# Patient Record
Sex: FEMALE | Race: WHITE | NOT HISPANIC OR LATINO | Employment: FULL TIME | URBAN - METROPOLITAN AREA
[De-identification: names, ages, dates, MRNs, and addresses within clinical notes are randomized per-mention and may not be internally consistent; named-entity substitution may affect disease eponyms.]

---

## 2017-01-05 ENCOUNTER — ANTICOAGULATION MONITORING (OUTPATIENT)
Dept: VASCULAR LAB | Facility: MEDICAL CENTER | Age: 57
End: 2017-01-05

## 2017-01-05 DIAGNOSIS — Z95.2 HX OF MECHANICAL AORTIC VALVE REPLACEMENT: ICD-10-CM

## 2017-01-05 LAB — INR PPP: 5.7 (ref 2–3.5)

## 2017-01-05 NOTE — PROGRESS NOTES
Anticoagulation Summary as of 1/5/2017     INR goal 2.5-3.5   Selected INR 5.7! (1/5/2017)   Maintenance plan 10 mg (2 mg x 5) on Mon, Wed, Fri; 8 mg (2 mg x 4) all other days   Weekly total 62 mg   Plan last modified Pedro Palma, PHARMD (12/5/2016)   Next INR check 1/12/2017   Target end date Indefinite    Indications   Hx of mechanical aortic valve replacement [V43.3] [Z95.2]         Anticoagulation Episode Summary     INR check location Home Draw    Preferred lab     Send INR reminders to     Comments       Anticoagulation Care Providers     Provider Role Specialty Phone number    Jerry Latham M.D. Referring Cardiology [phone]    Audie Isabel, PHARMD Responsible          Anticoagulation Patient Findings    Spoke to patient's  on phone. Lorraine has been drinking more wine than usual lately which may help explain supratherapeutic INR. HOLD warfarin tonight, 6 mg tomorrow, then resume previously prescribed regimen. Follow up INR in one week.    Audie Isabel, PHARMD

## 2017-01-12 LAB — INR PPP: 3.9 (ref 2–3.5)

## 2017-01-13 ENCOUNTER — ANTICOAGULATION MONITORING (OUTPATIENT)
Dept: VASCULAR LAB | Facility: MEDICAL CENTER | Age: 57
End: 2017-01-13

## 2017-01-13 DIAGNOSIS — Z95.2 HX OF MECHANICAL AORTIC VALVE REPLACEMENT: ICD-10-CM

## 2017-01-13 NOTE — PROGRESS NOTES
Anticoagulation Summary as of 1/13/2017     INR goal 2.5-3.5   Selected INR 3.9! (1/12/2017)   Maintenance plan 10 mg (2 mg x 5) on Mon, Wed, Fri; 8 mg (2 mg x 4) all other days   Weekly total 62 mg   Plan last modified Pedro Palma, PHARMD (12/5/2016)   Next INR check 1/19/2017   Target end date Indefinite    Indications   Hx of mechanical aortic valve replacement [V43.3] [Z95.2]         Anticoagulation Episode Summary     INR check location Home Draw    Preferred lab     Send INR reminders to     Comments       Anticoagulation Care Providers     Provider Role Specialty Phone number    Jerry Latham M.D. Referring Cardiology [phone]    Audie Isabel, PHARMD Responsible          Anticoagulation Patient Findings    Spoke to patient's  on phone. Decrease warfarin dose to 8 mg tonight then resume previously prescribed regimen. Follow up INR in one week.    Audie Isabel, PHARMD

## 2017-01-23 ENCOUNTER — ANTICOAGULATION MONITORING (OUTPATIENT)
Dept: VASCULAR LAB | Facility: MEDICAL CENTER | Age: 57
End: 2017-01-23

## 2017-01-23 DIAGNOSIS — Z95.2 HX OF MECHANICAL AORTIC VALVE REPLACEMENT: ICD-10-CM

## 2017-01-23 LAB — INR PPP: 3.6 (ref 2–3.5)

## 2017-01-23 NOTE — PROGRESS NOTES
Anticoagulation Summary as of 1/23/2017     INR goal 2.5-3.5   Selected INR 3.6! (1/23/2017)   Maintenance plan 10 mg (2 mg x 5) on Mon, Wed, Fri; 8 mg (2 mg x 4) all other days   Weekly total 62 mg   No change documented Audie Isabel, PHARMD   Plan last modified MITCHELL LuuD (12/5/2016)   Next INR check 1/30/2017   Target end date Indefinite    Indications   Hx of mechanical aortic valve replacement [V43.3] [Z95.2]         Anticoagulation Episode Summary     INR check location Home Draw    Preferred lab     Send INR reminders to     Comments       Anticoagulation Care Providers     Provider Role Specialty Phone number    Jerry Latham M.D. Referring Cardiology [phone]    Audie Isabel, PHARMD Responsible          Spoke to patient's  on phone. They just arrived back into [place] after extended travel across the . As Lorraine will be back on a much more regular diet we will continue her current dose of warfarin with close follow up. INR in one week.    Audie Isabel, PHARMD

## 2017-01-31 ENCOUNTER — ANTICOAGULATION MONITORING (OUTPATIENT)
Dept: VASCULAR LAB | Facility: MEDICAL CENTER | Age: 57
End: 2017-01-31

## 2017-01-31 DIAGNOSIS — Z95.2 HX OF MECHANICAL AORTIC VALVE REPLACEMENT: ICD-10-CM

## 2017-01-31 LAB — INR PPP: 1.9 (ref 2–3.5)

## 2017-01-31 NOTE — PROGRESS NOTES
OP Anticoagulation Service Note    Date: 1/31/2017    Anticoagulation Summary as of 1/31/2017     INR goal 2.5-3.5   Selected INR 1.9! (1/31/2017)   Maintenance plan 10 mg (2 mg x 5) on Mon, Wed, Fri; 8 mg (2 mg x 4) all other days   Weekly total 62 mg   Plan last modified MITCHELL LuuD (12/5/2016)   Next INR check 2/7/2017   Target end date Indefinite    Indications   Hx of mechanical aortic valve replacement [V43.3] [Z95.2]         Anticoagulation Episode Summary     INR check location Home Draw    Preferred lab     Send INR reminders to     Comments       Anticoagulation Care Providers     Provider Role Specialty Phone number    Jerry Latham M.D. Referring Cardiology [phone]    Audie Isabel, PHARMD Responsible          Anticoagulation Patient Findings      Plan:  INR is subtherapeutic. Left message on patient's answering machine/voicemail. Instructed patient to call back with any concerns regarding any unusual bleeding or bruising, an medication or diet changes or any signs or symptoms of thrombosis. Instructed patient to take 12 mg tonight ONLY then to resume medication as outlined above. Patient to follow up in 1 week.         Luci Ruiz, Pharm D

## 2017-02-08 ENCOUNTER — ANTICOAGULATION MONITORING (OUTPATIENT)
Dept: VASCULAR LAB | Facility: MEDICAL CENTER | Age: 57
End: 2017-02-08

## 2017-02-08 DIAGNOSIS — Z95.2 HX OF MECHANICAL AORTIC VALVE REPLACEMENT: ICD-10-CM

## 2017-02-08 LAB — INR PPP: 3.7 (ref 2–3.5)

## 2017-02-08 NOTE — PROGRESS NOTES
OP Anticoagulation Service Note    Date: 2/8/2017    Anticoagulation Summary as of 2/8/2017     INR goal 2.5-3.5   Selected INR 3.7! (2/8/2017)   Maintenance plan 10 mg (2 mg x 5) on Mon, Wed, Fri; 8 mg (2 mg x 4) all other days   Weekly total 62 mg   Plan last modified VERA LuuD (12/5/2016)   Next INR check 2/15/2017   Target end date Indefinite    Indications   Hx of mechanical aortic valve replacement [V43.3] [Z95.2]         Anticoagulation Episode Summary     INR check location Home Draw    Preferred lab     Send INR reminders to     Comments       Anticoagulation Care Providers     Provider Role Specialty Phone number    Jerry Latham M.D. Referring Cardiology [phone]    VERA VeronicaD Responsible          Anticoagulation Patient Findings      Plan:  INR is high today. Spoke with pt on the phone.  Confirmed dosing regimen. No missed or extra doses taken. Patient denies sign/symptoms of bleeding/clotting. No recent medication changes. She reports she has not gotten back to her usual diet and will try to this week. Instructed pt to call clinic with any concerns of bleeding or thrombosis. Instructed pt to adjust diet and will keep dose the same this week. Follow up in 1week        Vera Ríos D

## 2017-02-21 ENCOUNTER — ANTICOAGULATION MONITORING (OUTPATIENT)
Dept: VASCULAR LAB | Facility: MEDICAL CENTER | Age: 57
End: 2017-02-21

## 2017-02-21 DIAGNOSIS — Z95.2 HX OF MECHANICAL AORTIC VALVE REPLACEMENT: ICD-10-CM

## 2017-02-21 LAB — INR PPP: 6.3 (ref 2–3.5)

## 2017-02-21 NOTE — PROGRESS NOTES
Anticoagulation Summary as of 2/21/2017     INR goal 2.5-3.5   Selected INR 6.3! (2/21/2017)   Maintenance plan 8 mg (2 mg x 4) every day   Weekly total 56 mg   Plan last modified Pedro Palma PHARMD (2/21/2017)   Next INR check 2/23/2017   Target end date Indefinite    Indications   Hx of mechanical aortic valve replacement [V43.3] [Z95.2]         Anticoagulation Episode Summary     INR check location Home Draw    Preferred lab     Send INR reminders to     Comments       Anticoagulation Care Providers     Provider Role Specialty Phone number    Jerry Latham M.D. Referring Cardiology [phone]    Audie Isabel, PHARMD Responsible          Anticoagulation Patient Findings    Patient's INR was SUPRA therapeutic.   Denies any unusual s/s of bleeding, bruising, clotting.  Denies any changes to:   Diet   Medications  Confirmed dosing regimen.   Denies alcohol or cranberry use.   Pt is to hold for 2 days then begin 10% reduced warfarin dosing regimen.    Follow up in 2 days.    Pedro Palma, PHARMD

## 2017-02-24 LAB — INR PPP: 3.1 (ref 2–3.5)

## 2017-03-09 LAB — INR PPP: 4.7 (ref 2–3.5)

## 2017-03-17 LAB — INR PPP: 3.1 (ref 2–3.5)

## 2017-03-24 LAB — INR PPP: 3.8 (ref 2–3.5)

## 2017-03-27 ENCOUNTER — ANTICOAGULATION MONITORING (OUTPATIENT)
Dept: VASCULAR LAB | Facility: MEDICAL CENTER | Age: 57
End: 2017-03-27

## 2017-03-27 DIAGNOSIS — Z95.2 HX OF MECHANICAL AORTIC VALVE REPLACEMENT: ICD-10-CM

## 2017-03-27 NOTE — PROGRESS NOTES
Anticoagulation Summary as of 3/27/2017     INR goal 2.5-3.5   Selected INR 3.8! (3/24/2017)   Maintenance plan 8 mg (2 mg x 4) every day   Weekly total 56 mg   Plan last modified Pedro Palma, MITCHELLD (2/21/2017)   Next INR check 4/7/2017   Target end date Indefinite    Indications   Hx of mechanical aortic valve replacement [V43.3] [Z95.2]         Anticoagulation Episode Summary     INR check location Home Draw    Preferred lab     Send INR reminders to     Comments       Anticoagulation Care Providers     Provider Role Specialty Phone number    Jerry Latham M.D. Referring Cardiology [phone]    Audie Isabel, PHARMD Responsible          Anticoagulation Patient Findings    Spoke to patient's  on phone. No current signs of bleeding. No interval medication or diet changes. Decrease dose of warfarin to 4 mg tonight then resume 8 mg daily. Follow up INR in 2 weeks.    Audie Isabel, PHARMD

## 2017-04-12 ENCOUNTER — ANTICOAGULATION MONITORING (OUTPATIENT)
Dept: VASCULAR LAB | Facility: MEDICAL CENTER | Age: 57
End: 2017-04-12

## 2017-04-12 DIAGNOSIS — Z95.2 HX OF MECHANICAL AORTIC VALVE REPLACEMENT: ICD-10-CM

## 2017-04-12 LAB — INR PPP: 3.4 (ref 2–3.5)

## 2017-04-12 NOTE — PROGRESS NOTES
OP Anticoagulation Telephone Note    Date: 4/12/2017  Anticoagulation Summary as of 4/12/2017     INR goal 2.5-3.5   Selected INR 3.4 (4/10/2017)   Maintenance plan 8 mg (2 mg x 4) every day   Weekly total 56 mg   No change documented Valencia Loya, Med Ass't   Plan last modified Pedro Palma PHARMD (2/21/2017)   Next INR check 4/24/2017   Target end date Indefinite    Indications   Hx of mechanical aortic valve replacement [V43.3] [Z95.2]         Anticoagulation Episode Summary     INR check location Home Draw    Preferred lab     Send INR reminders to     Comments       Anticoagulation Care Providers     Provider Role Specialty Phone number    Jerry Latham M.D. Referring Cardiology [phone]    Audie Isabel, PHARMD Responsible          Anticoagulation Patient Findings   Negatives Missed Doses, Extra Doses, Medication Changes, Antibiotic Use, Diet Changes, Dental/Other Procedures, Hospitalization, Bleeding Gums, Nose Bleeds, Blood in Urine, Blood in Stool, Any Bruising, Other Complaints      Plan:  Spoke with patient on the phone. Patient is therapeutic today. Patient denies any changes in medications or diet. Patient denies any signs or symptoms of bleeding or clotting. Instructed patient to call clinic if any unusual bleeding or bruising occurs. Will continue dosing as outlined above. Will follow-up with patient in 2 weeks.    Valencia Loya, Medical Assistant    5/12/2017    Concur with plan outlined above    Audie Isabel, Pierre

## 2017-04-24 ENCOUNTER — ANTICOAGULATION MONITORING (OUTPATIENT)
Dept: VASCULAR LAB | Facility: MEDICAL CENTER | Age: 57
End: 2017-04-24

## 2017-04-24 DIAGNOSIS — Z95.2 HX OF MECHANICAL AORTIC VALVE REPLACEMENT: ICD-10-CM

## 2017-04-24 LAB — INR PPP: 5.2 (ref 2–3.5)

## 2017-04-24 NOTE — PROGRESS NOTES
Anticoagulation Summary as of 4/24/2017     INR goal 2.5-3.5   Selected INR 5.2! (4/24/2017)   Maintenance plan 8 mg (2 mg x 4) every day   Weekly total 56 mg   Plan last modified Pedro Palma, MITCHELLD (2/21/2017)   Next INR check 5/1/2017   Target end date Indefinite    Indications   Hx of mechanical aortic valve replacement [V43.3] [Z95.2]         Anticoagulation Episode Summary     INR check location Home Draw    Preferred lab     Send INR reminders to     Comments       Anticoagulation Care Providers     Provider Role Specialty Phone number    Jerry Latham M.D. Referring Cardiology [phone]    Audie Isabel, PHARMD Responsible          Anticoagulation Patient Findings    Left message for patient to HOLD warfarin x 1, 4 mg tomorrow, then resume 8 mg daily. Instructed patient to call clinic to discuss any possible precipitating factors of supratherapeutic INR. Follow up INR in one week.    Audie Isabel, PHARMD

## 2017-05-02 LAB — INR PPP: 5.3 (ref 2–3.5)

## 2017-05-05 ENCOUNTER — ANTICOAGULATION MONITORING (OUTPATIENT)
Dept: VASCULAR LAB | Facility: MEDICAL CENTER | Age: 57
End: 2017-05-05

## 2017-05-05 DIAGNOSIS — Z95.2 HX OF MECHANICAL AORTIC VALVE REPLACEMENT: ICD-10-CM

## 2017-05-05 NOTE — PROGRESS NOTES
Anticoagulation Summary as of 5/5/2017     INR goal 2.5-3.5   Selected INR 5.3! (5/2/2017)   Maintenance plan 6 mg (2 mg x 3) on Sun, Tue, Thu; 8 mg (2 mg x 4) all other days   Weekly total 50 mg   Plan last modified Messi Nelson, AYDEE (5/5/2017)   Next INR check 5/9/2017   Target end date Indefinite    Indications   Hx of mechanical aortic valve replacement [V43.3] [Z95.2]         Anticoagulation Episode Summary     INR check location Home Draw    Preferred lab     Send INR reminders to     Comments       Anticoagulation Care Providers     Provider Role Specialty Phone number    Jerry Latham M.D. Referring Cardiology [phone]    Audie Isabel, PHARMD Responsible          Anticoagulation Patient Findings    Left voicemail message to report a supratherapeutic INR of 5.3.  Requested pt contact the clinic for any s/s of unusual bleeding, bruising, clotting or any changes to diet or medication.   Instructed patient to HOLD X 1, 4mg X 1, then decrease weekly warfarin regimen by ~10% as detailed above.  Asked patient call clinic to discuss reasons for elevated INR.  FU 1 week from these results.  Messi Nelson, MITCHELLD

## 2017-05-10 ENCOUNTER — ANTICOAGULATION MONITORING (OUTPATIENT)
Dept: VASCULAR LAB | Facility: MEDICAL CENTER | Age: 57
End: 2017-05-10

## 2017-05-10 DIAGNOSIS — Z95.2 HX OF MECHANICAL AORTIC VALVE REPLACEMENT: ICD-10-CM

## 2017-05-10 LAB — INR PPP: 4.8 (ref 2–3.5)

## 2017-05-10 NOTE — PROGRESS NOTES
Anticoagulation Summary as of 5/10/2017     INR goal 2.5-3.5   Selected INR 4.8! (5/10/2017)   Maintenance plan 8 mg (2 mg x 4) on Mon, Wed, Fri; 6 mg (2 mg x 3) all other days   Weekly total 48 mg   Plan last modified Kranthi Corado PHARMD (5/10/2017)   Next INR check 5/17/2017   Target end date Indefinite    Indications   Hx of mechanical aortic valve replacement [V43.3] [Z95.2]         Anticoagulation Episode Summary     INR check location Home Draw    Preferred lab     Send INR reminders to     Comments       Anticoagulation Care Providers     Provider Role Specialty Phone number    Jerry Latham M.D. Referring Cardiology [phone]    MITCHELL VeronciaD Responsible          Anticoagulation Patient Findings    Spoke to patient's  on the phone. Patients INR was supratherapeutic today at 4.8 .Patient denied any signs/symptoms of bleeding or bruising. Patient denied any recent changes to medications or diet. Paitent was instructed to Hold today's dose , take a 4 mg tomorrow. Then reduce regimen by 4 % below, patient states they will start eating a lot more greens hence the lower % in reduction. Follow up in 1 week(s).         Sunday Monday Tuesday Wednesday Thursday Friday Saturday        6 mg      8 mg      6 mg      8 mg      6 mg      8 mg      6 mg        3 tab(s)      4 tab(s)      3 tab(s)      4 tab(s)      3 tab(s)      4 tab(s)    3 tab(s)                     Kranthi Corado Pharm.D

## 2017-05-19 ENCOUNTER — TELEPHONE (OUTPATIENT)
Dept: CARDIOLOGY | Facility: MEDICAL CENTER | Age: 57
End: 2017-05-19

## 2017-05-19 DIAGNOSIS — Z95.2 S/P AORTIC VALVE REPLACEMENT: Chronic | ICD-10-CM

## 2017-05-19 LAB — INR PPP: 2.2 (ref 2–3.5)

## 2017-05-19 NOTE — Clinical Note
May 23, 2017        Lorraine Cole  [address]  [facility] [address]        Dear Lorraine:    Your cardiologist Dr. May has reviewed your echocardiogram from 5/15/2017. Her interpretation is as follows:    Great news, heart function is normal and reassuring. Aortic valve function is also normal, looks great.    If you have any questions or concerns, please feel free to call the office at [phone].        Sincerely,          [facility]

## 2017-05-19 NOTE — TELEPHONE ENCOUNTER
Left patient a voicemail with instructions to call the office for test results (echocardiogram 5/15/2017).    TIARA RN

## 2017-05-19 NOTE — TELEPHONE ENCOUNTER
----- Message from Alix May M.D. sent at 5/19/2017  4:18 PM PDT -----  Great news, heart function is normal and reassuring. Aortic valve function is also normal looks great.   I heard that somehow we had misscheduled her appointment in [place]. We can either reschedule it for later this summer or I can see her back in the fall or winter. I apologize if there was a mess up.

## 2017-05-22 ENCOUNTER — ANTICOAGULATION MONITORING (OUTPATIENT)
Dept: VASCULAR LAB | Facility: MEDICAL CENTER | Age: 57
End: 2017-05-22

## 2017-05-22 DIAGNOSIS — Z95.2 HX OF MECHANICAL AORTIC VALVE REPLACEMENT: ICD-10-CM

## 2017-05-22 NOTE — PROGRESS NOTES
Anticoagulation Summary as of 5/22/2017     INR goal 2.5-3.5   Selected INR 2.2! (5/19/2017)   Maintenance plan 8 mg (2 mg x 4) on Mon, Wed, Fri; 6 mg (2 mg x 3) all other days   Weekly total 48 mg   No change documented Audie Isabel, AYDEE   Plan last modified Kranthi Corado PHARMD (5/10/2017)   Next INR check 5/26/2017   Target end date Indefinite    Indications   Hx of mechanical aortic valve replacement [V43.3] [Z95.2]         Anticoagulation Episode Summary     INR check location Home Draw    Preferred lab     Send INR reminders to     Comments       Anticoagulation Care Providers     Provider Role Specialty Phone number    Jerry Latham M.D. Referring Cardiology [phone]    Audie Isabel, PHARMD Responsible          Anticoagulation Patient Findings    Spoke to patient on phone. INR came down nicely with hold and dose reduction. Anticipate that it will come up a bit with a continuation of the current dose. Follow up INR on Friday.    Audie Isabel, MITCHELLD

## 2017-05-23 NOTE — TELEPHONE ENCOUNTER
Tried calling patient again, no answer. Mailed patient a letter with Dr. May's echocardiogram interpretation.    TIARA REYES

## 2017-05-27 LAB — INR PPP: 3.2 (ref 2–3.5)

## 2017-05-29 ENCOUNTER — ANTICOAGULATION MONITORING (OUTPATIENT)
Dept: VASCULAR LAB | Facility: MEDICAL CENTER | Age: 57
End: 2017-05-29

## 2017-05-29 DIAGNOSIS — Z95.2 HX OF MECHANICAL AORTIC VALVE REPLACEMENT: ICD-10-CM

## 2017-05-29 NOTE — PROGRESS NOTES
Anticoagulation Summary as of 5/29/2017     INR goal 2.5-3.5   Selected INR 3.2 (5/27/2017)   Maintenance plan 8 mg (2 mg x 4) on Mon, Wed, Fri; 6 mg (2 mg x 3) all other days   Weekly total 48 mg   Plan last modified Kranthi Corado PHARMD (5/10/2017)   Next INR check 6/2/2017   Target end date Indefinite    Indications   Hx of mechanical aortic valve replacement [V43.3] [Z95.2]         Anticoagulation Episode Summary     INR check location Home Draw    Preferred lab     Send INR reminders to     Comments       Anticoagulation Care Providers     Provider Role Specialty Phone number    Jerry Latham M.D. Referring Cardiology [phone]    Audie Isabel, PHARMD Responsible          Anticoagulation Patient Findings   Negatives Missed Doses, Extra Doses, Medication Changes, Antibiotic Use, Diet Changes, Dental/Other Procedures, Hospitalization, Bleeding Gums, Nose Bleeds, Blood in Urine, Blood in Stool, Any Bruising, Other Complaints        Spoke with patient today regarding therapeutic INR of 3.2.  Patient denies any signs/symptoms of bruising or bleeding or any changes in diet and medications.  Instructed patient to call clinic with any questions or concerns.  Pt is to continue with current warfarin dosing regimen.  Follow up in 1 weeks.    Messi Nelson, MITCHELLD

## 2017-06-05 ENCOUNTER — ANTICOAGULATION MONITORING (OUTPATIENT)
Dept: VASCULAR LAB | Facility: MEDICAL CENTER | Age: 57
End: 2017-06-05

## 2017-06-05 DIAGNOSIS — Z95.2 HX OF MECHANICAL AORTIC VALVE REPLACEMENT: ICD-10-CM

## 2017-06-05 LAB — INR PPP: 3.1 (ref 2–3.5)

## 2017-06-05 NOTE — PROGRESS NOTES
Anticoagulation Summary as of 6/5/2017     INR goal 2.5-3.5   Selected INR 3.1 (6/5/2017)   Maintenance plan 8 mg (2 mg x 4) on Mon, Wed, Fri; 6 mg (2 mg x 3) all other days   Weekly total 48 mg   No change documented Issa Caceres Med Ass't   Plan last modified Kranthi Corado PHARMD (5/10/2017)   Next INR check 6/19/2017   Target end date Indefinite    Indications   Hx of mechanical aortic valve replacement [V43.3] [Z95.2]         Anticoagulation Episode Summary     INR check location Home Draw    Preferred lab     Send INR reminders to     Comments       Anticoagulation Care Providers     Provider Role Specialty Phone number    Jerry Latham M.D. Referring Cardiology [phone]    Audie Isabel, PHARMD Responsible          Anticoagulation Patient Findings   Negatives Missed Doses, Extra Doses, Medication Changes, Antibiotic Use, Diet Changes, Dental/Other Procedures, Hospitalization, Bleeding Gums, Nose Bleeds, Blood in Urine, Blood in Stool, Any Bruising, Other Complaints        Spoke with patient's  to report a therapeutic INR.  Pt instructed to continue with current warfarin dosing regimen. Pt denies any s/s of bleeding, bruising, clotting or any changes to diet or medication.  Will follow up in 2 weeks.    Issa Caceres, Med Ass't    6/14/2017    Concur with plan outlined above    Audie Isabel, Pierre

## 2017-06-17 LAB — INR PPP: 1.1 (ref 2–3.5)

## 2017-06-19 ENCOUNTER — ANTICOAGULATION MONITORING (OUTPATIENT)
Dept: VASCULAR LAB | Facility: MEDICAL CENTER | Age: 57
End: 2017-06-19

## 2017-06-19 DIAGNOSIS — Z95.2 HX OF MECHANICAL AORTIC VALVE REPLACEMENT: ICD-10-CM

## 2017-06-19 LAB — INR PPP: 1.5 (ref 2–3.5)

## 2017-06-19 NOTE — PROGRESS NOTES
Anticoagulation Summary as of 6/19/2017     INR goal 2.5-3.5   Selected INR 1.1! (6/17/2017)   Maintenance plan 6 mg (2 mg x 3) on Sun, Tue, Thu; 8 mg (2 mg x 4) all other days   Weekly total 50 mg   Plan last modified Pedro Palma, PHARMD (6/19/2017)   Next INR check 6/19/2017   Target end date Indefinite    Indications   Hx of mechanical aortic valve replacement [V43.3] [Z95.2]         Anticoagulation Episode Summary     INR check location Home Draw    Preferred lab     Send INR reminders to     Comments       Anticoagulation Care Providers     Provider Role Specialty Phone number    Jerry Latham M.D. Referring Cardiology [phone]    Audie Isabel, PHARMD Responsible          Anticoagulation Patient Findings    INR significantly sub therapeutic.  Denies missed doses.  Pt denies any unusual s/s of bleeding, bruising, clotting or any changes to diet or medications.   Instructed pt to obtain another INR today.     Pedro Palma, PHARMD

## 2017-06-21 ENCOUNTER — ANTICOAGULATION MONITORING (OUTPATIENT)
Dept: VASCULAR LAB | Facility: MEDICAL CENTER | Age: 57
End: 2017-06-21

## 2017-06-21 DIAGNOSIS — Z95.2 HX OF MECHANICAL AORTIC VALVE REPLACEMENT: ICD-10-CM

## 2017-06-21 NOTE — PROGRESS NOTES
Anticoagulation Summary as of 6/21/2017     INR goal 2.5-3.5   Selected INR 1.5! (6/19/2017)   Maintenance plan 6 mg (2 mg x 3) on Sun, Tue, Thu; 8 mg (2 mg x 4) all other days   Weekly total 50 mg   Plan last modified Pedro Palma, PHARMD (6/19/2017)   Next INR check 6/23/2017   Target end date Indefinite    Indications   Hx of mechanical aortic valve replacement [V43.3] [Z95.2]         Anticoagulation Episode Summary     INR check location Home Draw    Preferred lab     Send INR reminders to     Comments       Anticoagulation Care Providers     Provider Role Specialty Phone number    Jerry Latham M.D. Referring Cardiology [phone]    Audie Isabel, PHARMD Responsible          Anticoagulation Patient Findings   Negatives Missed Doses, Extra Doses, Medication Changes, Antibiotic Use, Diet Changes, Dental/Other Procedures, Hospitalization, Bleeding Gums, Nose Bleeds, Blood in Urine, Blood in Stool, Any Bruising, Other Complaints        Spoke with patient and  today regarding subtherapeutic INR of 1.5.  Patient denies any signs/symptoms of bruising or bleeding or any changes in diet and medications.  Instructed patient to call clinic with any questions or concerns.  Instructed patient to bolus with 10mg X 2, then resume current warfarin regimen.  Follow up in 2 days.    Messi Nelson, PHARMD

## 2017-06-25 LAB — INR PPP: 3.1 (ref 2–3.5)

## 2017-06-26 ENCOUNTER — ANTICOAGULATION MONITORING (OUTPATIENT)
Dept: VASCULAR LAB | Facility: MEDICAL CENTER | Age: 57
End: 2017-06-26

## 2017-06-26 DIAGNOSIS — Z95.2 HX OF MECHANICAL AORTIC VALVE REPLACEMENT: ICD-10-CM

## 2017-06-26 NOTE — PROGRESS NOTES
Anticoagulation Summary as of 6/26/2017     INR goal 2.5-3.5   Selected INR 3.1 (6/25/2017)   Maintenance plan 6 mg (2 mg x 3) on Sun, Tue, Thu; 8 mg (2 mg x 4) all other days   Weekly total 50 mg   No change documented Tamara Greenfield   Plan last modified Pedro Palma, MITCHELLD (6/19/2017)   Next INR check 7/3/2017   Target end date Indefinite    Indications   Hx of mechanical aortic valve replacement [V43.3] [Z95.2]         Anticoagulation Episode Summary     INR check location Home Draw    Preferred lab     Send INR reminders to     Comments       Anticoagulation Care Providers     Provider Role Specialty Phone number    Jerry Latham M.D. Referring Cardiology [phone]    Audie Isabel, PHARMD Responsible          Anticoagulation Patient Findings   Negatives Missed Doses, Extra Doses, Medication Changes, Antibiotic Use, Diet Changes, Dental/Other Procedures, Hospitalization, Bleeding Gums, Nose Bleeds, Blood in Urine, Blood in Stool, Any Bruising, Other Complaints        Spoke with the patient's  on the phone today, reporting a therapeutic INR of 3.1.  Confirmed the current warfarin dosing regimen and patient compliance. Patient denies any interval changes to diet and/or medications. Patient denies any signs/symptoms of bleeding or clotting. Patient will continue with the current warfarin dosing regimen, and will follow up again in 1 week.    Chan GamezD

## 2017-07-02 LAB — INR PPP: 3.3 (ref 2–3.5)

## 2017-07-05 ENCOUNTER — ANTICOAGULATION MONITORING (OUTPATIENT)
Dept: VASCULAR LAB | Facility: MEDICAL CENTER | Age: 57
End: 2017-07-05

## 2017-07-05 DIAGNOSIS — Z95.2 HX OF MECHANICAL AORTIC VALVE REPLACEMENT: ICD-10-CM

## 2017-07-05 NOTE — PROGRESS NOTES
Anticoagulation Summary as of 7/5/2017     INR goal 2.5-3.5   Selected INR 3.3 (7/2/2017)   Maintenance plan 6 mg (2 mg x 3) on Sun, Tue, Thu; 8 mg (2 mg x 4) all other days   Weekly total 50 mg   Plan last modified Pedro Palma, PHARMD (6/19/2017)   Next INR check 7/10/2017   Target end date Indefinite    Indications   Hx of mechanical aortic valve replacement [V43.3] [Z95.2]         Anticoagulation Episode Summary     INR check location Home Draw    Preferred lab     Send INR reminders to     Comments       Anticoagulation Care Providers     Provider Role Specialty Phone number    Jerry Latham M.D. Referring Cardiology [phone]    Audie Isabel, PHARMD Responsible          Anticoagulation Patient Findings   Negatives Missed Doses, Extra Doses, Medication Changes, Antibiotic Use, Diet Changes, Dental/Other Procedures, Hospitalization, Bleeding Gums, Nose Bleeds, Blood in Urine, Blood in Stool, Any Bruising, Other Complaints        Spoke with patient today regarding therapeutic INR of 3.3.  Patient denies any signs/symptoms of bruising or bleeding or any changes in diet and medications.  Instructed patient to call clinic with any questions or concerns.  Pt is to continue with current warfarin dosing regimen.  Follow up in 1 weeks.    Messi Nelson, MITCHELLD

## 2017-07-10 ENCOUNTER — ANTICOAGULATION MONITORING (OUTPATIENT)
Dept: VASCULAR LAB | Facility: MEDICAL CENTER | Age: 57
End: 2017-07-10

## 2017-07-10 DIAGNOSIS — Z95.2 HX OF MECHANICAL AORTIC VALVE REPLACEMENT: ICD-10-CM

## 2017-07-10 LAB — INR PPP: 3.8 (ref 2–3.5)

## 2017-07-10 NOTE — PROGRESS NOTES
Anticoagulation Summary as of 7/10/2017     INR goal 2.5-3.5   Selected INR 3.8! (7/10/2017)   Maintenance plan 6 mg (2 mg x 3) on Sun, Tue, Thu; 8 mg (2 mg x 4) all other days   Weekly total 50 mg   Plan last modified Pedro Palma, PHARMD (6/19/2017)   Next INR check 7/17/2017   Target end date Indefinite    Indications   Hx of mechanical aortic valve replacement [V43.3] [Z95.2]         Anticoagulation Episode Summary     INR check location Home Draw    Preferred lab     Send INR reminders to     Comments       Anticoagulation Care Providers     Provider Role Specialty Phone number    Jerry Latham M.D. Referring Cardiology [phone]    Audie Isabel, PHARMD Responsible          Anticoagulation Patient Findings    Spoke with pt.   INR was SUPRA therapeutic.   Pt denies any unusual s/s of bleeding, bruising, clotting or any changes to diet or medications.  Pt confirms warfarin dosing.     Will decrease the warfarin dose today, 6mg x1, and then resume normal warfarin dosing.     Recheck INR in 1 week.     Alexa Sams, PHARMD    7/31/2017    Concur with plan outlined above    Audie Isabel, Pierre

## 2017-07-18 ENCOUNTER — ANTICOAGULATION MONITORING (OUTPATIENT)
Dept: VASCULAR LAB | Facility: MEDICAL CENTER | Age: 57
End: 2017-07-18

## 2017-07-18 DIAGNOSIS — Z95.2 HX OF MECHANICAL AORTIC VALVE REPLACEMENT: ICD-10-CM

## 2017-07-18 LAB — INR PPP: 4.1 (ref 2–3.5)

## 2017-07-19 NOTE — PROGRESS NOTES
Anticoagulation Summary as of 7/18/2017     INR goal 2.5-3.5   Selected INR 4.1! (7/18/2017)   Maintenance plan 8 mg (2 mg x 4) on Mon, Wed, Fri; 6 mg (2 mg x 3) all other days   Weekly total 48 mg   Plan last modified POOJA FowlerP.N. (7/18/2017)   Next INR check 7/25/2017   Target end date Indefinite    Indications   Hx of mechanical aortic valve replacement [V43.3] [Z95.2]         Anticoagulation Episode Summary     INR check location Home Draw    Preferred lab     Send INR reminders to     Comments       Anticoagulation Care Providers     Provider Role Specialty Phone number    Jerry Latham M.D. Referring Cardiology [phone]    Audie Isabel, PHARMD Responsible          Anticoagulation Patient Findings    Patient is supra- therapeutic today. Up from 3.8 to 4.1 despite a decrease in the dose last week. Unable to speak with the patient. VM decrease dose today to 4 mg tonigh then decrease weekly dose to 6 mg sun, thur & sat; 8 mg mon, wed & fri. Call back for change in diet or medications.        Follow up in 1 weeks.  POOJA FowlerP.N.    8/11/2017    Concur with plan outlined above    Audie Isabel, VeraD

## 2017-08-01 LAB — INR PPP: 4 (ref 2–3.5)

## 2017-08-10 ENCOUNTER — ANTICOAGULATION MONITORING (OUTPATIENT)
Dept: VASCULAR LAB | Facility: MEDICAL CENTER | Age: 57
End: 2017-08-10

## 2017-08-10 DIAGNOSIS — Z95.2 HX OF MECHANICAL AORTIC VALVE REPLACEMENT: ICD-10-CM

## 2017-08-10 LAB — INR PPP: 3.9 (ref 2–3.5)

## 2017-08-10 NOTE — PROGRESS NOTES
Anticoagulation Summary as of 8/10/2017     INR goal 2.5-3.5   Selected INR 4.0! (8/1/2017)   Maintenance plan 8 mg (2 mg x 4) on Wed; 6 mg (2 mg x 3) all other days   Weekly total 44 mg   Plan last modified Clyde Breaux, PHARMD (8/10/2017)   Next INR check 8/17/2017   Target end date Indefinite    Indications   Hx of mechanical aortic valve replacement [V43.3] [Z95.2]         Anticoagulation Episode Summary     INR check location Home Draw    Preferred lab     Send INR reminders to     Comments       Anticoagulation Care Providers     Provider Role Specialty Phone number    Jerry Latham M.D. Referring Cardiology [phone]    Audie Isabel, PHARMD Responsible          Anticoagulation Patient Findings      Current Outpatient Prescriptions on File Prior to Visit   Medication Sig Dispense Refill   • warfarin (COUMADIN) 2 MG Tab Take 4-5 tablets (8-10 mg) by mouth daily as directed by [facility] 450 Tab 2   • oxycodone immediate-release (ROXICODONE) 5 MG Tab Take 1 Tab by mouth every 8 hours as needed for Severe Pain. 30 Tab 0   • mag hydrox-al hydrox-simeth (MAALOX PLUS ES OR MYLANTA DS) 400-400-40 MG/5ML Suspension Take 20 mL by mouth every 2 hours as needed (Indigestion). 560 mL    • Docusate Sodium (DSS) 100 MG Cap Take 100 mg by mouth every morning. 30 Cap 1   • Psyllium (METAMUCIL) 100 % Pack Take 1 Packet by mouth every day. 30 Packet 1   • senna-docusate (PERICOLACE OR SENOKOT S) 8.6-50 MG Tab Take 1 Tab by mouth every evening. 30 Tab 1   • therapeutic multivitamin-minerals (THERAGRAN-M) Tab Take 1 Tab by mouth every day with lunch. 30 Tab    • magnesium hydroxide (MILK OF MAGNESIA) 400 MG/5ML Suspension Take 30 mL by mouth 1 time daily as needed (constipation). 1 Bottle 3   • acetaminophen (TYLENOL) 500 MG Tab Take 1 Tab by mouth every 6 hours as needed for Mild Pain, Moderate Pain or Fever. 60 Tab    • albuterol (VENTOLIN OR PROVENTIL) 108 (90 BASE) MCG/ACT AERS Inhale 2 Puffs by mouth every 6  hours as needed.       No current facility-administered medications on file prior to visit.       Lab Results   Component Value Date/Time    SODIUM 139 08/05/2015 05:33 AM    POTASSIUM 4.4 08/05/2015 05:33 AM    CHLORIDE 106 08/05/2015 05:33 AM    CO2 27 08/05/2015 05:33 AM    GLUCOSE 85 08/05/2015 05:33 AM    BUN 12 08/05/2015 05:33 AM    CREATININE 0.73 08/05/2015 05:33 AM      INR  supra-therapeutic.   Left a voice message for the patient, asked patient to please call the anticoagulation clinic if they have any signs/symptoms of bleeding and/or thrombosis or any changes to diet or medications.    Follow up appointment in 1 week(s)    Decrease weekly warfarin dose as noted    Clyde Breaux, PHARMD

## 2017-08-24 ENCOUNTER — ANTICOAGULATION MONITORING (OUTPATIENT)
Dept: VASCULAR LAB | Facility: MEDICAL CENTER | Age: 57
End: 2017-08-24

## 2017-08-24 DIAGNOSIS — Z95.2 HX OF MECHANICAL AORTIC VALVE REPLACEMENT: ICD-10-CM

## 2017-08-24 LAB — INR PPP: 4.1 (ref 2–3.5)

## 2017-08-24 NOTE — PROGRESS NOTES
Anticoagulation Summary as of 8/24/2017     INR goal 2.5-3.5   Selected INR 4.1! (8/24/2017)   Maintenance plan 6 mg (2 mg x 3) every day   Weekly total 42 mg   Plan last modified BETO Fowler (8/24/2017)   Next INR check 8/31/2017   Target end date Indefinite    Indications   Hx of mechanical aortic valve replacement [V43.3] [Z95.2]         Anticoagulation Episode Summary     INR check location Home Draw    Preferred lab     Send INR reminders to     Comments       Anticoagulation Care Providers     Provider Role Specialty Phone number    Jerry Latham M.D. Referring Cardiology [phone]    Audie Isabel, PHARMD Responsible          Anticoagulation Patient Findings      Current Outpatient Prescriptions on File Prior to Visit   Medication Sig Dispense Refill   • warfarin (COUMADIN) 2 MG Tab Take 4-5 tablets (8-10 mg) by mouth daily as directed by [facility] 450 Tab 2   • oxycodone immediate-release (ROXICODONE) 5 MG Tab Take 1 Tab by mouth every 8 hours as needed for Severe Pain. 30 Tab 0   • mag hydrox-al hydrox-simeth (MAALOX PLUS ES OR MYLANTA DS) 400-400-40 MG/5ML Suspension Take 20 mL by mouth every 2 hours as needed (Indigestion). 560 mL    • Docusate Sodium (DSS) 100 MG Cap Take 100 mg by mouth every morning. 30 Cap 1   • Psyllium (METAMUCIL) 100 % Pack Take 1 Packet by mouth every day. 30 Packet 1   • senna-docusate (PERICOLACE OR SENOKOT S) 8.6-50 MG Tab Take 1 Tab by mouth every evening. 30 Tab 1   • therapeutic multivitamin-minerals (THERAGRAN-M) Tab Take 1 Tab by mouth every day with lunch. 30 Tab    • magnesium hydroxide (MILK OF MAGNESIA) 400 MG/5ML Suspension Take 30 mL by mouth 1 time daily as needed (constipation). 1 Bottle 3   • acetaminophen (TYLENOL) 500 MG Tab Take 1 Tab by mouth every 6 hours as needed for Mild Pain, Moderate Pain or Fever. 60 Tab    • albuterol (VENTOLIN OR PROVENTIL) 108 (90 BASE) MCG/ACT AERS Inhale 2 Puffs by mouth every 6 hours as needed.       No  current facility-administered medications on file prior to visit.       Lab Results   Component Value Date/Time    SODIUM 139 08/05/2015 05:33 AM    POTASSIUM 4.4 08/05/2015 05:33 AM    CHLORIDE 106 08/05/2015 05:33 AM    CO2 27 08/05/2015 05:33 AM    GLUCOSE 85 08/05/2015 05:33 AM    BUN 12 08/05/2015 05:33 AM    CREATININE 0.73 08/05/2015 05:33 AM        Spoke with patient by phone. Patient is supra- therapeutic. Denies any medication, not eating many green veg pt is traveling.  No current symptoms of bleeding or thrombosis reported.  Follow up in 1 weeks.        Decrease dose tonight to 4 mg then follow the above dose decrease and test in 1 week.      RAI Fowler.P.N.    9/15/2017    Concur with plan outlined above    Audie Isabel, VeraD

## 2017-09-05 ENCOUNTER — ANTICOAGULATION MONITORING (OUTPATIENT)
Dept: VASCULAR LAB | Facility: MEDICAL CENTER | Age: 57
End: 2017-09-05

## 2017-09-05 DIAGNOSIS — Z95.2 HX OF MECHANICAL AORTIC VALVE REPLACEMENT: ICD-10-CM

## 2017-09-05 LAB — INR PPP: 2.5 (ref 2–3.5)

## 2017-09-05 NOTE — PROGRESS NOTES
OP Anticoagulation Telephone Note    Date: 9/5/2017  Anticoagulation Summary  As of 9/5/2017    INR goal:   2.5-3.5   TTR:   35.9 % (2.2 y)   Today's INR:   2.5 (9/2/2017)   Maintenance plan:   6 mg (2 mg x 3) every day   Weekly total:   42 mg   No change documented:   Hitesh Rao Ass't   Plan last modified:   BETO Fowler (8/24/2017)   Next INR check:   9/9/2017   Target end date:   Indefinite    Indications    Hx of mechanical aortic valve replacement [V43.3] [Z95.2]             Anticoagulation Episode Summary     INR check location:   Home Draw    Preferred lab:       Send INR reminders to:       Comments:         Anticoagulation Care Providers     Provider Role Specialty Phone number    Jerry Latham M.D. Referring Cardiology [phone]    Audie Isabel, PharmD Responsible          Anticoagulation Patient Findings  Patient Findings     Negatives:   Signs/symptoms of thrombosis, Signs/symptoms of bleeding, Laboratory test error suspected, Change in health, Change in alcohol use, Change in activity, Upcoming invasive procedure, Emergency department visit, Upcoming dental procedure, Missed doses, Extra doses, Change in medications, Change in diet/appetite, Hospital admission, Bruising, Other complaints      Plan:  Spoke with patient on the phone. Patient is therapeutic today. Patient denies any changes in medications or diet. Patient denies any signs or symptoms of bleeding or clotting. Instructed patient to call clinic if any unusual bleeding or bruising occurs. Will continue dosing as outlined above. Will follow-up with patient in 1 week.      Valencia Loya, Medical Assistant    9/13/2017    Concur with plan outlined above    Audie Isabel PharmD

## 2017-09-11 LAB — INR PPP: 2.5 (ref 2–3.5)

## 2017-09-13 ENCOUNTER — ANTICOAGULATION MONITORING (OUTPATIENT)
Dept: VASCULAR LAB | Facility: MEDICAL CENTER | Age: 57
End: 2017-09-13

## 2017-09-13 DIAGNOSIS — Z95.2 HX OF MECHANICAL AORTIC VALVE REPLACEMENT: ICD-10-CM

## 2017-09-13 NOTE — PROGRESS NOTES
Anticoagulation Summary  As of 9/13/2017    INR goal:   2.5-3.5   TTR:   36.6 % (2.2 y)   Today's INR:   2.5 (9/11/2017)   Maintenance plan:   6 mg (2 mg x 3) every day   Weekly total:   42 mg   No change documented:   Audie Isabel, Pierre   Plan last modified:   BETO Fowler (8/24/2017)   Next INR check:   9/25/2017   Target end date:   Indefinite    Indications    Hx of mechanical aortic valve replacement [V43.3] [Z95.2]             Anticoagulation Episode Summary     INR check location:   Home Draw    Preferred lab:       Send INR reminders to:       Comments:         Anticoagulation Care Providers     Provider Role Specialty Phone number    Jerry Latham M.D. Referring Cardiology [phone]    Audie Isabel, PharmD Responsible          Anticoagulation Patient Findings    Spoke to patient's  on phone. No current signs of bleeding. No interval medication changes. Continue current dose of warfarin. Follow up INR in 2 weeks.    Audie Isabel, PharmD

## 2017-09-25 ENCOUNTER — ANTICOAGULATION MONITORING (OUTPATIENT)
Dept: VASCULAR LAB | Facility: MEDICAL CENTER | Age: 57
End: 2017-09-25

## 2017-09-25 DIAGNOSIS — Z95.2 HX OF MECHANICAL AORTIC VALVE REPLACEMENT: ICD-10-CM

## 2017-09-25 LAB — INR PPP: 1.9 (ref 2–3.5)

## 2017-09-25 NOTE — PROGRESS NOTES
Anticoagulation Summary  As of 9/25/2017    INR goal:   2.5-3.5   TTR:   36.0 % (2.3 y)   Today's INR:   1.9!   Maintenance plan:   6 mg (2 mg x 3) every day   Weekly total:   42 mg   Plan last modified:   BETO Fowler (8/24/2017)   Next INR check:   10/2/2017   Target end date:   Indefinite    Indications    Hx of mechanical aortic valve replacement [V43.3] [Z95.2]             Anticoagulation Episode Summary     INR check location:   Home Draw    Preferred lab:       Send INR reminders to:       Comments:         Anticoagulation Care Providers     Provider Role Specialty Phone number    Jerry Latham M.D. Referring Cardiology [phone]    Audie Isabel, PharmD Responsible          Anticoagulation Patient Findings    Spoke to patient's SO on phone. No current signs of bleeding. No interval medication changes. Increase dose of warfarin to 8 mg today and tomorrow then resume 6 mg daily. Follow up INR in 1 week.    Audie Isabel, PharmD

## 2017-10-02 ENCOUNTER — ANTICOAGULATION MONITORING (OUTPATIENT)
Dept: VASCULAR LAB | Facility: MEDICAL CENTER | Age: 57
End: 2017-10-02

## 2017-10-02 DIAGNOSIS — Z95.2 HX OF MECHANICAL AORTIC VALVE REPLACEMENT: ICD-10-CM

## 2017-10-02 LAB — INR PPP: 2.7 (ref 2–3.5)

## 2017-10-02 NOTE — PROGRESS NOTES
Anticoagulation Summary  As of 10/2/2017    INR goal:   2.5-3.5   TTR:   35.9 % (2.3 y)   Today's INR:   2.7   Maintenance plan:   6 mg (2 mg x 3) every day   Weekly total:   42 mg   No change documented:   Issa Caceres, Med Ass't   Plan last modified:   BETO Fowler (8/24/2017)   Next INR check:   10/16/2017   Target end date:   Indefinite    Indications    Hx of mechanical aortic valve replacement [V43.3] [Z95.2]             Anticoagulation Episode Summary     INR check location:   Home Draw    Preferred lab:       Send INR reminders to:       Comments:         Anticoagulation Care Providers     Provider Role Specialty Phone number    Jerry Latham M.D. Referring Cardiology [phone]    Audie Isabel, PharmD Responsible          Anticoagulation Patient Findings  Patient Findings     Negatives:   Signs/symptoms of thrombosis, Signs/symptoms of bleeding, Laboratory test error suspected, Change in health, Change in alcohol use, Change in activity, Upcoming invasive procedure, Emergency department visit, Upcoming dental procedure, Missed doses, Extra doses, Change in medications, Change in diet/appetite, Hospital admission, Bruising, Other complaints         Spoke with patient to report a therapeutic INR.  Pt instructed to continue with current warfarin dosing regimen. Pt denies any s/s of bleeding, bruising, clotting or any changes to diet or medication.  Will follow up in 2 weeks.    Issa Caceres, Med Ass't    10/2/2017    Concur with plan outlined above    Audie Isabel, VeraD

## 2017-10-24 LAB — INR PPP: 3.6 (ref 2–3.5)

## 2017-10-25 ENCOUNTER — ANTICOAGULATION MONITORING (OUTPATIENT)
Dept: VASCULAR LAB | Facility: MEDICAL CENTER | Age: 57
End: 2017-10-25

## 2017-10-25 DIAGNOSIS — Z95.2 HX OF MECHANICAL AORTIC VALVE REPLACEMENT: ICD-10-CM

## 2017-10-25 NOTE — PROGRESS NOTES
Anticoagulation Summary  As of 10/25/2017    INR goal:   2.5-3.5   TTR:   37.3 % (2.3 y)   Today's INR:   3.6! (10/24/2017)   Maintenance plan:   6 mg (2 mg x 3) every day   Weekly total:   42 mg   Plan last modified:   BETO Fowler (8/24/2017)   Next INR check:   11/6/2017   Target end date:   Indefinite    Indications    Hx of mechanical aortic valve replacement [V43.3] [Z95.2]             Anticoagulation Episode Summary     INR check location:   Home Draw    Preferred lab:       Send INR reminders to:       Comments:         Anticoagulation Care Providers     Provider Role Specialty Phone number    Jerry Latham M.D. Referring Cardiology [phone]    Audie Isabel, PharmD Responsible          Anticoagulation Patient Findings    See note by Agnieszka Valdez 05/16/2014    Evelin Cuellar, VeraD

## 2017-11-06 ENCOUNTER — ANTICOAGULATION MONITORING (OUTPATIENT)
Dept: VASCULAR LAB | Facility: MEDICAL CENTER | Age: 57
End: 2017-11-06

## 2017-11-06 DIAGNOSIS — Z95.2 HX OF MECHANICAL AORTIC VALVE REPLACEMENT: ICD-10-CM

## 2017-11-06 LAB — INR PPP: 2.7 (ref 2–3.5)

## 2017-11-06 NOTE — PROGRESS NOTES
OP Anticoagulation Telephone Note    Date: 11/6/2017  Anticoagulation Summary  As of 11/6/2017    INR goal:   2.5-3.5   TTR:   38.0 % (2.4 y)   Today's INR:   2.7   Maintenance plan:   6 mg (2 mg x 3) every day   Weekly total:   42 mg   No change documented:   Valencia Loya, Med Ass't   Plan last modified:   BETO Fowler (8/24/2017)   Next INR check:   11/20/2017   Target end date:   Indefinite    Indications    Hx of mechanical aortic valve replacement [V43.3] [Z95.2]             Anticoagulation Episode Summary     INR check location:   Home Draw    Preferred lab:       Send INR reminders to:       Comments:         Anticoagulation Care Providers     Provider Role Specialty Phone number    Jerry Latham M.D. Referring Cardiology [phone]    Audie Isabel, PharmD Responsible          Anticoagulation Patient Findings  Patient Findings     Negatives:   Signs/symptoms of thrombosis, Signs/symptoms of bleeding, Laboratory test error suspected, Change in health, Change in alcohol use, Change in activity, Upcoming invasive procedure, Emergency department visit, Upcoming dental procedure, Missed doses, Extra doses, Change in medications, Change in diet/appetite, Hospital admission, Bruising, Other complaints      Plan:  Spoke with patient's  on the phone. Patient is therapeutic today. Patient denies any changes in medications or diet. Patient denies any signs or symptoms of bleeding or clotting. Instructed patient to call clinic if any unusual bleeding or bruising occurs. Will continue dosing as outlined above. Will follow-up with patient in 2 weeks.    Valencia Loya, Medical Assistant    11/6/2017    Concur with plan outlined above    Audie Isabel PharmD

## 2017-11-20 LAB — INR PPP: 1.3 (ref 2–3.5)

## 2017-11-21 ENCOUNTER — ANTICOAGULATION MONITORING (OUTPATIENT)
Dept: VASCULAR LAB | Facility: MEDICAL CENTER | Age: 57
End: 2017-11-21

## 2017-11-21 DIAGNOSIS — Z95.2 HX OF MECHANICAL AORTIC VALVE REPLACEMENT: ICD-10-CM

## 2017-11-21 NOTE — PROGRESS NOTES
Anticoagulation Summary  As of 11/21/2017    INR goal:   2.5-3.5   TTR:   37.7 % (2.4 y)   Today's INR:   1.3! (11/20/2017)   Maintenance plan:   6 mg (2 mg x 3) every day   Weekly total:   42 mg   Plan last modified:   BETO Fowler (8/24/2017)   Next INR check:   11/27/2017   Target end date:   Indefinite    Indications    Hx of mechanical aortic valve replacement [V43.3] [Z95.2]             Anticoagulation Episode Summary     INR check location:   Home Draw    Preferred lab:       Send INR reminders to:       Comments:         Anticoagulation Care Providers     Provider Role Specialty Phone number    Jerry Latham M.D. Referring Cardiology [phone]    Audie Isabel, PharmD Responsible          Left message for patient to increase warfarin dose to 8 mg today and tomorrow, then resume 6 mg daily. Follow up INR in one week.    Audie Isabel, PharmD

## 2017-11-28 ENCOUNTER — ANTICOAGULATION MONITORING (OUTPATIENT)
Dept: VASCULAR LAB | Facility: MEDICAL CENTER | Age: 57
End: 2017-11-28

## 2017-11-28 DIAGNOSIS — Z95.2 HX OF MECHANICAL AORTIC VALVE REPLACEMENT: ICD-10-CM

## 2017-11-28 LAB — INR PPP: 1.8 (ref 2–3.5)

## 2017-11-28 NOTE — PROGRESS NOTES
OP Telephone Anticoagulation Service Note    Date: 11/28/2017      Anticoagulation Summary  As of 11/28/2017    INR goal:   2.5-3.5   TTR:   37.3 % (2.4 y)   Today's INR:   1.8!   Maintenance plan:   8 mg (2 mg x 4) on Tue, Sat; 6 mg (2 mg x 3) all other days   Weekly total:   46 mg   Plan last modified:   Luci Ruiz, Pierre (11/28/2017)   Next INR check:   12/5/2017   Target end date:   Indefinite    Indications    Hx of mechanical aortic valve replacement [V43.3] [Z95.2]             Anticoagulation Episode Summary     INR check location:   Home Draw    Preferred lab:       Send INR reminders to:       Comments:         Anticoagulation Care Providers     Provider Role Specialty Phone number    Jerry Latham M.D. Referring Cardiology [phone]    Audie Isabel, PharmD Responsible          Anticoagulation Patient Findings        Plan: INR is subtherapeutic. Spoke with pt and her  on the phone. Confirmed they followed directions from last week. Unclear why INR got low, pt denies missed dose, consistent diet, denies V8 juice or protein shakes. Instructed pt to take 8 mg x 2 days then begin increased weekly regimen. Follow up 1 week.     Luci Ruiz, VeraD

## 2017-12-06 ENCOUNTER — ANTICOAGULATION MONITORING (OUTPATIENT)
Dept: VASCULAR LAB | Facility: MEDICAL CENTER | Age: 57
End: 2017-12-06

## 2017-12-06 DIAGNOSIS — Z95.2 HX OF MECHANICAL AORTIC VALVE REPLACEMENT: ICD-10-CM

## 2017-12-06 LAB — INR PPP: 2.6 (ref 2–3.5)

## 2017-12-06 NOTE — PROGRESS NOTES
Anticoagulation Summary  As of 12/6/2017    INR goal:   2.5-3.5   TTR:   37.1 % (2.5 y)   Today's INR:   2.6   Maintenance plan:   8 mg (2 mg x 4) on Tue, Sat; 6 mg (2 mg x 3) all other days   Weekly total:   46 mg   Plan last modified:   Vera HayesD (11/28/2017)   Next INR check:   12/13/2017   Target end date:   Indefinite    Indications    Hx of mechanical aortic valve replacement [V43.3] [Z95.2]             Anticoagulation Episode Summary     INR check location:   Home Draw    Preferred lab:       Send INR reminders to:       Comments:         Anticoagulation Care Providers     Provider Role Specialty Phone number    Jerry Latham M.D. Referring Cardiology [phone]    Vera VeronicaD Responsible          Anticoagulation Patient Findings  Patient Findings     Negatives:   Signs/symptoms of thrombosis, Signs/symptoms of bleeding, Laboratory test error suspected, Change in health, Change in alcohol use, Change in activity, Upcoming invasive procedure, Emergency department visit, Upcoming dental procedure, Missed doses, Extra doses, Change in medications, Change in diet/appetite, Hospital admission, Bruising, Other complaints        Spoke with patients  today regarding therapeutic INR of 2.6.  Patient denies any signs/symptoms of bruising or bleeding or any changes in diet and medications.  Instructed patient to call clinic with any questions or concerns.  Pt is to continue with current warfarin dosing regimen.  Follow up in 1 weeks, to reduce risk of adverse events related to this high risk medication,  Warfarin.    Messi Nelson, PharmD

## 2017-12-13 LAB — INR PPP: 1.8 (ref 2–3.5)

## 2017-12-14 ENCOUNTER — ANTICOAGULATION MONITORING (OUTPATIENT)
Dept: VASCULAR LAB | Facility: MEDICAL CENTER | Age: 57
End: 2017-12-14

## 2017-12-14 DIAGNOSIS — Z95.2 HX OF MECHANICAL AORTIC VALVE REPLACEMENT: ICD-10-CM

## 2017-12-14 NOTE — PROGRESS NOTES
Anticoagulation Summary  As of 12/14/2017    INR goal:   2.5-3.5   TTR:   36.9 % (2.5 y)   Today's INR:   1.8! (12/13/2017)   Maintenance plan:   8 mg (2 mg x 4) on Tue, Thu, Sat; 6 mg (2 mg x 3) all other days   Weekly total:   48 mg   Plan last modified:   Alexa Sams, PharmD (12/14/2017)   Next INR check:   12/20/2017   Target end date:   Indefinite    Indications    Hx of mechanical aortic valve replacement [V43.3] [Z95.2]             Anticoagulation Episode Summary     INR check location:   Home Draw    Preferred lab:       Send INR reminders to:       Comments:         Anticoagulation Care Providers     Provider Role Specialty Phone number    Jerry Latham M.D. Referring Cardiology [phone]    Audie Isabel, PharmD Responsible          Anticoagulation Patient Findings      Spoke with patient.  INR is SUB therapeutic.   Pt states that she had been eating more greens before they left to go out of town.  Pt denies any unusual s/s of bleeding, bruising, clotting or any changes to diet or medications. Denies any etoh, cranberries, supplements, or illness.   Pt verifies warfarin weekly dosing.     Will have pt take a boost dose today of 8mg x1 and then resume her normal weekly dose. Pt is going to not be having greens like she was as they are traveling now.     Repeat INR in 1 weeks.     Alexa Sams, PharmD

## 2017-12-26 DIAGNOSIS — Z95.2 HX OF MECHANICAL AORTIC VALVE REPLACEMENT: ICD-10-CM

## 2017-12-28 ENCOUNTER — ANTICOAGULATION MONITORING (OUTPATIENT)
Dept: VASCULAR LAB | Facility: MEDICAL CENTER | Age: 57
End: 2017-12-28

## 2017-12-28 DIAGNOSIS — Z95.2 HX OF MECHANICAL AORTIC VALVE REPLACEMENT: ICD-10-CM

## 2017-12-28 LAB — INR PPP: 2.6 (ref 2–3.5)

## 2017-12-28 NOTE — PROGRESS NOTES
OP Anticoagulation Telephone Note    Date: 12/28/2017  Anticoagulation Summary  As of 12/28/2017    INR goal:   2.5-3.5   TTR:   36.5 % (2.5 y)   Today's INR:   2.6 (12/27/2017)   Maintenance plan:   8 mg (2 mg x 4) on Tue, Sat; 6 mg (2 mg x 3) all other days   Weekly total:   46 mg   No change documented:   Hitesh Rao Ass't   Plan last modified:   Alexa Sams PharmD (12/14/2017)   Next INR check:   1/10/2018   Target end date:   Indefinite    Indications    Hx of mechanical aortic valve replacement [V43.3] [Z95.2]             Anticoagulation Episode Summary     INR check location:   Home Draw    Preferred lab:       Send INR reminders to:       Comments:         Anticoagulation Care Providers     Provider Role Specialty Phone number    Jerry Latham M.D. Referring Cardiology [phone]    Audie Isabel PharmD Responsible          Anticoagulation Patient Findings  Patient Findings     Negatives:   Signs/symptoms of thrombosis, Signs/symptoms of bleeding, Laboratory test error suspected, Change in health, Change in alcohol use, Change in activity, Upcoming invasive procedure, Emergency department visit, Upcoming dental procedure, Missed doses, Extra doses, Change in medications, Change in diet/appetite, Hospital admission, Bruising, Other complaints      Plan:  Spoke with patient on the phone. Patient is therapeutic today. Patient denies any changes in medications or diet. Patient denies any signs or symptoms of bleeding or clotting. Instructed patient to call clinic if any unusual bleeding or bruising occurs. Will continue dosing as outlined above. Will follow-up with patient in 2 weeks.    Valencia Loya, Medical Assistant    12/28/2017    Concur with plan outlined above    Audie Isabel PharmD

## 2018-01-11 ENCOUNTER — ANTICOAGULATION MONITORING (OUTPATIENT)
Dept: VASCULAR LAB | Facility: MEDICAL CENTER | Age: 58
End: 2018-01-11

## 2018-01-11 DIAGNOSIS — Z95.2 HX OF MECHANICAL AORTIC VALVE REPLACEMENT: ICD-10-CM

## 2018-01-11 LAB — INR PPP: 1.6 (ref 2–3.5)

## 2018-01-11 NOTE — PROGRESS NOTES
OP Anticoagulation Telephone Note    Date: 1/11/2018       Plan:  Spoke with pt and pt's  on the phone. Pt is subtherapeutic today. By mistake, pt has only been taking 6mg daily.  She does not have her home monitor and is traveling, so she will be using a standing order previously sent to a lab in [place].  Denies any changes in medications or diet. Denies any s/sx of bleeding or clotting. Will continue warfarin dosing as outlined below (previously stable regimen) and follow-up with pt in 1.5wks, per pt request.    MON/THUR:8MG    Anticoagulation Summary  As of 1/11/2018    INR goal:   2.5-3.5   TTR:   36.1 % (2.6 y)   Today's INR:   1.6!   Maintenance plan:   8 mg (2 mg x 4) on Mon, Thu; 6 mg (2 mg x 3) all other days   Weekly total:   46 mg   Plan last modified:   Fay Griffith, A.P.NCharanjit (1/11/2018)   Next INR check:   1/22/2018   Target end date:   Indefinite    Indications    Hx of mechanical aortic valve replacement [V43.3] [Z95.2]             Anticoagulation Episode Summary     INR check location:   Home Draw    Preferred lab:       Send INR reminders to:       Comments:         Anticoagulation Care Providers     Provider Role Specialty Phone number    Jerry Latham M.D. Referring Cardiology [phone]    Audie Isabel, PharmD Responsible                DAX Villatoro  [facility]

## 2018-01-24 LAB — INR PPP: 3.4 (ref 2–3.5)

## 2018-01-25 ENCOUNTER — ANTICOAGULATION MONITORING (OUTPATIENT)
Dept: VASCULAR LAB | Facility: MEDICAL CENTER | Age: 58
End: 2018-01-25

## 2018-01-25 ENCOUNTER — TELEPHONE (OUTPATIENT)
Dept: MEDICAL GROUP | Facility: PHYSICIAN GROUP | Age: 58
End: 2018-01-25

## 2018-01-25 DIAGNOSIS — Z95.2 HX OF MECHANICAL AORTIC VALVE REPLACEMENT: ICD-10-CM

## 2018-01-25 NOTE — PROGRESS NOTES
Anticoagulation Summary  As of 1/25/2018    INR goal:   2.5-3.5   TTR:   36.3 % (2.6 y)   Today's INR:   3.4 (1/24/2018)   Maintenance plan:   8 mg (2 mg x 4) on Mon, Thu; 6 mg (2 mg x 3) all other days   Weekly total:   46 mg   Plan last modified:   Fay Griffith ACharanjitPCharanjitNCharanjit (1/11/2018)   Next INR check:   2/8/2018   Target end date:   Indefinite    Indications    Hx of mechanical aortic valve replacement [V43.3] [Z95.2]             Anticoagulation Episode Summary     INR check location:   Home Draw    Preferred lab:       Send INR reminders to:       Comments:         Anticoagulation Care Providers     Provider Role Specialty Phone number    Jerry Latham M.D. Referring Cardiology [phone]    Audie Isabel, PharmD Responsible          Anticoagulation Patient Findings      INR  therapeutic.   Left a voice message for the patient, asked patient to please call the anticoagulation clinic if they have any signs/symptoms of bleeding and/or thrombosis or any changes to diet or medications.    Follow up appointment in 2 week(s)    Continue weekly warfarin dose as noted      Clyde Breaux, PharmD

## 2018-01-27 LAB — INR PPP: 3.4 (ref 2–3.5)

## 2018-01-29 ENCOUNTER — ANTICOAGULATION MONITORING (OUTPATIENT)
Dept: VASCULAR LAB | Facility: MEDICAL CENTER | Age: 58
End: 2018-01-29

## 2018-01-29 DIAGNOSIS — Z95.2 HX OF MECHANICAL AORTIC VALVE REPLACEMENT: ICD-10-CM

## 2018-02-07 LAB — INR PPP: 3.4 (ref 2–3.5)

## 2018-02-08 ENCOUNTER — ANTICOAGULATION MONITORING (OUTPATIENT)
Dept: VASCULAR LAB | Facility: MEDICAL CENTER | Age: 58
End: 2018-02-08

## 2018-02-08 DIAGNOSIS — Z95.2 HX OF MECHANICAL AORTIC VALVE REPLACEMENT: ICD-10-CM

## 2018-02-08 NOTE — PROGRESS NOTES
Anticoagulation Summary  As of 2/8/2018    INR goal:   2.5-3.5   TTR:   37.2 % (2.6 y)   Today's INR:   3.4 (2/7/2018)   Maintenance plan:   8 mg (2 mg x 4) on Mon, Thu; 6 mg (2 mg x 3) all other days   Weekly total:   46 mg   Plan last modified:   Fay Griffith ACharanjitPCharanjitNCharanjit (1/11/2018)   Next INR check:   2/21/2018   Target end date:   Indefinite    Indications    Hx of mechanical aortic valve replacement [V43.3] [Z95.2]             Anticoagulation Episode Summary     INR check location:   Home Draw    Preferred lab:       Send INR reminders to:       Comments:   Porfirio      Anticoagulation Care Providers     Provider Role Specialty Phone number    Jerry Latham M.D. Referring Cardiology [phone]    Audie Isabel, PharmD Responsible          Anticoagulation Patient Findings      Pharmacy student Karla to call patient to report a therapeutic INR.    Pt instructed to continue with current warfarin dosing regimen, confirms dosing.   Pt denies any s/s of bleeding, bruising, clotting or any changes to diet or medication.    Will follow up in 2 weeks.     Alexa Sams, PharmD

## 2018-02-23 LAB — INR PPP: 3.6 (ref 2–3.5)

## 2018-02-26 ENCOUNTER — ANTICOAGULATION MONITORING (OUTPATIENT)
Dept: VASCULAR LAB | Facility: MEDICAL CENTER | Age: 58
End: 2018-02-26

## 2018-02-26 DIAGNOSIS — Z95.2 HX OF MECHANICAL AORTIC VALVE REPLACEMENT: ICD-10-CM

## 2018-02-26 NOTE — PROGRESS NOTES
Anticoagulation Summary  As of 2/26/2018    INR goal:   2.5-3.5   TTR:   37.4 % (2.7 y)   Today's INR:   3.6! (2/23/2018)   Maintenance plan:   8 mg (2 mg x 4) on Mon, Thu; 6 mg (2 mg x 3) all other days   Weekly total:   46 mg   Plan last modified:   Fay Griffith ACharanjitP.NCharanjit (1/11/2018)   Next INR check:   3/9/2018   Target end date:   Indefinite    Indications    Hx of mechanical aortic valve replacement [V43.3] [Z95.2]             Anticoagulation Episode Summary     INR check location:   Home Draw    Preferred lab:       Send INR reminders to:       Comments:   Porfirio      Anticoagulation Care Providers     Provider Role Specialty Phone number    Jerry Latham M.D. Referring Cardiology [phone]    Audie Isabel, PharmD Responsible          Anticoagulation Patient Findings      Left voicemail message to report a SUPRA therapeutic INR of 3.6.    Will have pt take a reduced dose of warfarin today of 6 mg and then   Pt to continue with current warfarin dosing regimen. Requested pt contact the clinic for any s/s of unusual bleeding, bruising, clotting or any changes to diet or medication.    FU INR in 2 weeks.    Alexa Sams, PharmD

## 2018-03-03 LAB — INR PPP: 4.2 (ref 2–3.5)

## 2018-03-05 ENCOUNTER — ANTICOAGULATION MONITORING (OUTPATIENT)
Dept: VASCULAR LAB | Facility: MEDICAL CENTER | Age: 58
End: 2018-03-05

## 2018-03-05 DIAGNOSIS — Z95.2 HX OF MECHANICAL AORTIC VALVE REPLACEMENT: ICD-10-CM

## 2018-03-05 NOTE — PROGRESS NOTES
Anticoagulation Summary  As of 3/5/2018    INR goal:   2.5-3.5   TTR:   37.1 % (2.7 y)   Today's INR:   4.2! (3/3/2018)   Maintenance plan:   6 mg (2 mg x 3) every day   Weekly total:   42 mg   Plan last modified:   Alexa Sams, PharmD (3/5/2018)   Next INR check:   3/12/2018   Target end date:   Indefinite    Indications    Hx of mechanical aortic valve replacement [V43.3] [Z95.2]             Anticoagulation Episode Summary     INR check location:   Home Draw    Preferred lab:       Send INR reminders to:       Comments:   Alere      Anticoagulation Care Providers     Provider Role Specialty Phone number    Jerry Latham M.D. Referring Cardiology [phone]    Vera VeronicaD Responsible          Anticoagulation Patient Findings    Spoke with patient.  INR is SUPRA therapeutic.   Pt denies any unusual s/s of bleeding, bruising, clotting or any changes to diet or medications. Denies any etoh, cranberries, supplements, or illness.   Pt verifies warfarin weekly dosing.     Will have pt HOLD her warfarin dose today and then reduced down to 6mg daily.     Repeat INR in 1 week.     Alexa Sams, PharmD

## 2018-03-12 ENCOUNTER — ANTICOAGULATION MONITORING (OUTPATIENT)
Dept: VASCULAR LAB | Facility: MEDICAL CENTER | Age: 58
End: 2018-03-12

## 2018-03-12 DIAGNOSIS — Z95.2 HX OF MECHANICAL AORTIC VALVE REPLACEMENT: ICD-10-CM

## 2018-03-12 LAB — INR PPP: 2.6 (ref 2–3.5)

## 2018-03-12 NOTE — PROGRESS NOTES
Anticoagulation Summary  As of 3/12/2018    INR goal:   2.5-3.5   TTR:   37.3 % (2.7 y)   Today's INR:   2.6   Maintenance plan:   6 mg (2 mg x 3) every day   Weekly total:   42 mg   No change documented:   Issa HERNANDEZ'Rayrosmery, Med Ass't   Plan last modified:   Alexa Sams, PharmD (3/5/2018)   Next INR check:   3/19/2018   Target end date:   Indefinite    Indications    Hx of mechanical aortic valve replacement [V43.3] [Z95.2]             Anticoagulation Episode Summary     INR check location:   Home Draw    Preferred lab:       Send INR reminders to:       Comments:   Alere      Anticoagulation Care Providers     Provider Role Specialty Phone number    Jerry Latham M.D. Referring Cardiology [phone]    Audie Isabel, PharmD Responsible          Anticoagulation Patient Findings  Patient Findings     Negatives:   Signs/symptoms of thrombosis, Signs/symptoms of bleeding, Laboratory test error suspected, Change in health, Change in alcohol use, Change in activity, Upcoming invasive procedure, Emergency department visit, Upcoming dental procedure, Missed doses, Extra doses, Change in medications, Change in diet/appetite, Hospital admission, Bruising, Other complaints        Left voicemail message to report therapeutic INR of 2.6.  Patient to continue with current warfarin dosing regimen. Requested pt contact the clinic for any change to diet or medication, and to report any signs or symptoms of bleeding, bruising or clotting.  Pt to follow up in 1 week. Spoke with Marisol VERDUZCO about the decrease in patients INR.    Issa Caceres, Med Ass't     Cosignature:    Audie Isabel, Pierre

## 2018-03-25 LAB — INR PPP: 4.5 (ref 2–3.5)

## 2018-03-26 ENCOUNTER — ANTICOAGULATION MONITORING (OUTPATIENT)
Dept: VASCULAR LAB | Facility: MEDICAL CENTER | Age: 58
End: 2018-03-26

## 2018-03-26 DIAGNOSIS — Z95.2 HX OF MECHANICAL AORTIC VALVE REPLACEMENT: ICD-10-CM

## 2018-03-26 NOTE — PROGRESS NOTES
Anticoagulation Summary  As of 3/26/2018    INR goal:   2.5-3.5   TTR:   37.4 % (2.8 y)   Today's INR:   4.5! (3/25/2018)   Maintenance plan:   6 mg (2 mg x 3) every day   Weekly total:   42 mg   Plan last modified:   Alexa Sams, PharmD (3/5/2018)   Next INR check:   4/2/2018   Target end date:   Indefinite    Indications    Hx of mechanical aortic valve replacement [V43.3] [Z95.2]             Anticoagulation Episode Summary     INR check location:   Home Draw    Preferred lab:       Send INR reminders to:       Comments:   Alere      Anticoagulation Care Providers     Provider Role Specialty Phone number    Jerry Latham M.D. Referring Cardiology [phone]    Audie Isabel, PharmD Responsible          Anticoagulation Patient Findings    Spoke to patient on phone. Her  recently received a CA diagnosis and is beginning chemo. This is causing a great deal of stress for her. She states that she skipped her warfarin dose last night. No further dosage adjustment warranted at this time. Continue warfarin 6 mg. Follow up INR in one week.    Audie Isabel, PharmD

## 2018-04-01 LAB — INR PPP: 3.5 (ref 2–3.5)

## 2018-04-02 ENCOUNTER — ANTICOAGULATION MONITORING (OUTPATIENT)
Dept: VASCULAR LAB | Facility: MEDICAL CENTER | Age: 58
End: 2018-04-02

## 2018-04-02 DIAGNOSIS — Z95.2 HX OF MECHANICAL AORTIC VALVE REPLACEMENT: ICD-10-CM

## 2018-04-02 NOTE — PROGRESS NOTES
Anticoagulation Summary  As of 4/2/2018    INR goal:   2.5-3.5   TTR:   37.2 % (2.8 y)   Today's INR:   3.5 (4/1/2018)   Maintenance plan:   6 mg (2 mg x 3) every day   Weekly total:   42 mg   Plan last modified:   Alexa Sams, PharmD (4/2/2018)   Next INR check:   4/8/2018   Target end date:   Indefinite    Indications    Hx of mechanical aortic valve replacement [V43.3] [Z95.2]             Anticoagulation Episode Summary     INR check location:   Home Draw    Preferred lab:       Send INR reminders to:       Comments:   Alere      Anticoagulation Care Providers     Provider Role Specialty Phone number    Jerry Latham M.D. Referring Cardiology [phone]    Audie Isabel, PharmD Responsible          Anticoagulation Patient Findings      Spoke with patient to report a therapeutic INR.    Discussed with pt, she feels that this week is going to be better as far as diet goes.   Pt instructed to continue with current warfarin dosing regimen, confirms dosing.   Pt denies any s/s of bleeding, bruising, clotting or any changes to diet or medication.    Will follow up in 1 week(s).     Alexa Sams, PharmD

## 2018-04-11 ENCOUNTER — ANTICOAGULATION VISIT (OUTPATIENT)
Dept: VASCULAR LAB | Facility: MEDICAL CENTER | Age: 58
End: 2018-04-11
Attending: INTERNAL MEDICINE
Payer: COMMERCIAL

## 2018-04-11 VITALS — DIASTOLIC BLOOD PRESSURE: 66 MMHG | HEART RATE: 71 BPM | SYSTOLIC BLOOD PRESSURE: 129 MMHG

## 2018-04-11 DIAGNOSIS — Z95.2 HX OF MECHANICAL AORTIC VALVE REPLACEMENT: ICD-10-CM

## 2018-04-11 LAB
INR BLD: 3.7 (ref 0.9–1.2)
INR PPP: 3.7 (ref 2–3.5)

## 2018-04-11 PROCEDURE — 85610 PROTHROMBIN TIME: CPT

## 2018-04-11 PROCEDURE — 99212 OFFICE O/P EST SF 10 MIN: CPT | Performed by: NURSE PRACTITIONER

## 2018-04-11 NOTE — PROGRESS NOTES
Anticoagulation Summary  As of 4/11/2018    INR goal:   2.5-3.5   TTR:   36.8 % (2.8 y)   Today's INR:   3.7!   Maintenance plan:   6 mg (2 mg x 3) every day   Weekly total:   42 mg   Plan last modified:   Alexa Sams, PharmD (4/2/2018)   Next INR check:   4/25/2018   Target end date:   Indefinite    Indications    Hx of mechanical aortic valve replacement [V43.3] [Z95.2]             Anticoagulation Episode Summary     INR check location:   Home Draw    Preferred lab:       Send INR reminders to:       Comments:   Alere      Anticoagulation Care Providers     Provider Role Specialty Phone number    Jerry Latham M.D. Referring Cardiology [phone]    Audie Isabel, PharmD Responsible          Anticoagulation Patient Findings      HPI:  Lorraine Cole seen in clinic today for follow up on anticoagulation therapy in the presence of MVR. Denies any changes to current medical/health status since last appointment. Denies any medication  changes. No current symptoms of bleeding or thrombosis reported. Pt is a home monitor patient but is currently out of strips. Currently her  is hospitalized here in ICU. Under a lot of stress. Diet is non consistent.    A/P:   INR supratherapeutic. HOLD tonight then continue current regimen. BP recorded in vitals.    Follow up appointment in 2 week(s) with home monitor.    Next Appointment: Wednesday, April 25, 2018.     Teri VERDUZCO

## 2018-04-27 LAB — INR PPP: 1.6 (ref 2–3.5)

## 2018-04-30 ENCOUNTER — ANTICOAGULATION MONITORING (OUTPATIENT)
Dept: VASCULAR LAB | Facility: MEDICAL CENTER | Age: 58
End: 2018-04-30

## 2018-04-30 DIAGNOSIS — Z95.2 HX OF MECHANICAL AORTIC VALVE REPLACEMENT: ICD-10-CM

## 2018-04-30 NOTE — PROGRESS NOTES
Anticoagulation Summary  As of 4/30/2018    INR goal:   2.5-3.5   TTR:   37.0 % (2.8 y)   Today's INR:   1.6! (4/27/2018)   Warfarin maintenance plan:   6 mg (2 mg x 3) every day   Weekly warfarin total:   42 mg   Plan last modified:   Alexa Sams, PharmD (4/2/2018)   Next INR check:   5/4/2018   Target end date:   Indefinite    Indications    Hx of mechanical aortic valve replacement [V43.3] [Z95.2]             Anticoagulation Episode Summary     INR check location:   Home Draw    Preferred lab:       Send INR reminders to:       Comments:   Porfirio  Call Lorraine's cell phone for dose adjustments - [phone]  2nd contact number is [phone] (Courtney, pt's sister)      Anticoagulation Care Providers     Provider Role Specialty Phone number    Alix May M.D. Referring Cardiology [phone]    Audie Isabel, PharmD Responsible          Anticoagulation Patient Findings    Left voicemail message to report a subtherapeutic INR of 1.6.  Requested pt contact the clinic for any s/s of unusual bleeding, bruising, clotting or any changes to diet or medication.   Instructed patient to bolus with 10mg X 1, then resume current warfarin regimen.  FU 1 weeks.  Messi Nelson, PharmD

## 2018-05-03 ENCOUNTER — TELEPHONE (OUTPATIENT)
Dept: CARDIOLOGY | Facility: MEDICAL CENTER | Age: 58
End: 2018-05-03

## 2018-05-03 NOTE — TELEPHONE ENCOUNTER
----- Message from Bella Mayers sent at 5/3/2018 10:56 AM PDT -----  Regarding: patient calling for PET results  Vineet       Patient is calling for test results from December 2017. She said she had a PET done at [facility]. Patient can be reached at [phone].    ===========================================================    Called pt, pt actually would like to know her last PT INR level, informed pt that upon chart review, her last PT INR was drawn 4/30/18 and it was 1.6, pt appreciative and verbalizes understanding

## 2018-07-02 ENCOUNTER — TELEPHONE (OUTPATIENT)
Dept: VASCULAR LAB | Facility: MEDICAL CENTER | Age: 58
End: 2018-07-02

## 2018-07-02 ENCOUNTER — ANTICOAGULATION MONITORING (OUTPATIENT)
Dept: VASCULAR LAB | Facility: MEDICAL CENTER | Age: 58
End: 2018-07-02

## 2018-07-02 DIAGNOSIS — Z95.2 HX OF MECHANICAL AORTIC VALVE REPLACEMENT: ICD-10-CM

## 2018-07-03 NOTE — PROGRESS NOTES
Spoke with pts sister. Pt has moved to [place] and PCP has taken over management of her INRs. Will discharge from our clinic.     Luci Ruiz, Pharm D